# Patient Record
(demographics unavailable — no encounter records)

---

## 2024-11-20 NOTE — REASON FOR VISIT
[Arrhythmia/ECG Abnorrmalities] : arrhythmia/ECG abnormalities [Hyperlipidemia] : hyperlipidemia [Hypertension] : hypertension [FreeTextEntry3] : Dr. Goodman [FreeTextEntry1] : HORACIO SALVADOR  is a 64 year F  who presents today Nov 20, 2024 for clinical follow-up. Overall she has been feeling well. There has been no recent illness or hospital stay. Medications have remained unchanged. Asymptomatic from cardiovascular and arrhythmia standpoint.  Today she denies chest pain, pressure, unusual shortness of breath, lightheadedness, dizziness, near syncope or syncope.  History of HTN and HLD.  Monitoring BP at home and states readings are controlled.  Active with walking about a mile a day. She has been trying to control her diet with low-salt diet.  She has no increased alcohol intake. She is a smoker.  Unable to quit.  According to her she is trying.  She knows options which includes  New [employer] quit program.  Medical history is mainly significant for Essential hypertension.  No history of CHF CKD.  History of smoking.  Her blood pressure readings at home are remaining less than 130/80.  Elevated in doctor's offices according to her. Dyslipidemia was advised statin therapy which she has not been taking.  No significant side effects in the past Abnormal EKG nonspecific. Extensive history of smoking  Medical history not significant for microinfarction, coronary artery disease, cerebrovascular accident, peripheral artery disease, rheumatic fever, thyroid or Lyme disease.  She has no claudication pain, palpitation, dizziness near syncopal or syncopal event.  No claudication..  Overall stable weight.

## 2024-11-20 NOTE — PHYSICAL EXAM
[Well Developed] : well developed [Well Nourished] : well nourished [No Acute Distress] : no acute distress [Normal Venous Pressure] : normal venous pressure [No Carotid Bruit] : no carotid bruit [Normal S1, S2] : normal S1, S2 [No Rub] : no rub [No Gallop] : no gallop [Clear Lung Fields] : clear lung fields [Good Air Entry] : good air entry [No Respiratory Distress] : no respiratory distress  [Normal Gait] : normal gait [No Edema] : no edema [No Cyanosis] : no cyanosis [No Clubbing] : no clubbing [Normal Radial B/L] : normal radial B/L [Normal Speech] : normal speech [Alert and Oriented] : alert and oriented [de-identified] : Midsystolic murmur at the apex

## 2024-11-20 NOTE — DISCUSSION/SUMMARY
[FreeTextEntry1] : HORACIO SALVADOR  is a 64 year F  who presents today Nov 20, 2024 with the above history and the following active issues.  Essential hypertension.  Tolerating valsartan to 80 mg.  Goal BP <130/80 Low sodium diet  Remain active with cardiovascular exercise Recommend monitor BP at home over the next month and keep a log. We will review the readings over the phone. If BP not at goal recommend increasing Valsartan to 160mg QD  Calcific nonobstructive coronary artery disease. Continue aspirin 81 mg.  Continue atorvastatin to 40 mg.  Need to aim LDL cholesterol less than 70.  Risk benefits alternatives side effects again reviewed.  Pathophysiology of atherosclerosis discussed.  Continue beta-blockers.  Management of hypertension. Continued aggressive lifestyle and risk factor modification reviewed.  Smoking cessation discussed Change in clinical status would require invasive coronary angiography guided therapy. Follow-up fasting lipid panel Dyslipidemia.  Coronary atherosclerosis. LDL <70 Lifestyle and risk factor modification  Smoking cessation. Counseling done. Relationship with ASCVD, and associated morbidity, mortality was reviewed. Options available discussed. Based on above test we will discuss further management which may include invasive coronary angiography guided therapy.  Incidental finding of liver cyst.  According to her it is known to her.  Follow-up with your office.  If needed consider dedicated right upper quadrant sonogram.  Incidental finding of right upper lobe nodule.  Follow-up with Dr. Garza or pulmonary physician.  Red flag symptoms which would warrant sooner emergent evaluation reviewed with the patient.  Questions and concerns were addressed and answered.  Limitations of non-invasive testing reviewed  Sincerely,  Sully Smalls PA-C Patients history, testing and plan reviewed with supervising MD: Dr. Elva Balderrama

## 2024-11-20 NOTE — CARDIOLOGY SUMMARY
[de-identified] : March 14, 2023 normal sinus rhythm nonspecific ST-T change 4/3/24 nsr no stt changes  [de-identified] : ETT.  May 2, 2023.  5 minutes 23 seconds Ish protocol 7 METs PACs PVCs.  Dyspnea on exertion.  ST-T changes  equivocal for ischemia. [de-identified] : Echocardiogram.  EF 55 to 60% aortic calcification.  Liver cyst [de-identified] : Coronary CTA.  July 2023.  Nonobstructive.  0.9 cm right upper lobe nodule.  LAD 20%

## 2024-11-20 NOTE — PHYSICAL EXAM
[Well Developed] : well developed [Well Nourished] : well nourished [No Acute Distress] : no acute distress [Normal Venous Pressure] : normal venous pressure [No Carotid Bruit] : no carotid bruit [Normal S1, S2] : normal S1, S2 [No Rub] : no rub [No Gallop] : no gallop [Clear Lung Fields] : clear lung fields [Good Air Entry] : good air entry [No Respiratory Distress] : no respiratory distress  [Normal Gait] : normal gait [No Edema] : no edema [No Cyanosis] : no cyanosis [No Clubbing] : no clubbing [Normal Radial B/L] : normal radial B/L [Normal Speech] : normal speech [Alert and Oriented] : alert and oriented [de-identified] : Midsystolic murmur at the apex

## 2024-11-20 NOTE — REASON FOR VISIT
[Arrhythmia/ECG Abnorrmalities] : arrhythmia/ECG abnormalities [Hyperlipidemia] : hyperlipidemia [Hypertension] : hypertension [FreeTextEntry3] : Dr. Goodman [FreeTextEntry1] : HORACIO SALVADOR  is a 64 year F  who presents today Nov 20, 2024 for clinical follow-up. Overall she has been feeling well. There has been no recent illness or hospital stay. Medications have remained unchanged. Asymptomatic from cardiovascular and arrhythmia standpoint.  Today she denies chest pain, pressure, unusual shortness of breath, lightheadedness, dizziness, near syncope or syncope.  History of HTN and HLD.  Monitoring BP at home and states readings are controlled.  Active with walking about a mile a day. She has been trying to control her diet with low-salt diet.  She has no increased alcohol intake. She is a smoker.  Unable to quit.  According to her she is trying.  She knows options which includes  [facility] [employer] quit program.  Medical history is mainly significant for Essential hypertension.  No history of CHF CKD.  History of smoking.  Her blood pressure readings at home are remaining less than 130/80.  Elevated in doctor's offices according to her. Dyslipidemia was advised statin therapy which she has not been taking.  No significant side effects in the past Abnormal EKG nonspecific. Extensive history of smoking  Medical history not significant for microinfarction, coronary artery disease, cerebrovascular accident, peripheral artery disease, rheumatic fever, thyroid or Lyme disease.  She has no claudication pain, palpitation, dizziness near syncopal or syncopal event.  No claudication..  Overall stable weight.

## 2024-11-20 NOTE — COUNSELING
[Cessation strategies including cessation program discussed] : Cessation strategies including cessation program discussed [Use of nicotine replacement therapies and other medications discussed] : Use of nicotine replacement therapies and other medications discussed [Encouraged to pick a quit date and identify support needed to quit] : Encouraged to pick a quit date and identify support needed to quit [Smoking Cessation Program Referral] : Smoking Cessation Program Referral  [Yes] : Willing to quit smoking [FreeTextEntry2] : Does not want referral.  According to her she has spoken to people. [FreeTextEntry1] : 3

## 2024-11-20 NOTE — CARDIOLOGY SUMMARY
[de-identified] : March 14, 2023 normal sinus rhythm nonspecific ST-T change 4/3/24 nsr no stt changes  [de-identified] : ETT.  May 2, 2023.  5 minutes 23 seconds Ish protocol 7 METs PACs PVCs.  Dyspnea on exertion.  ST-T changes  equivocal for ischemia. [de-identified] : Echocardiogram.  EF 55 to 60% aortic calcification.  Liver cyst [de-identified] : Coronary CTA.  July 2023.  Nonobstructive.  0.9 cm right upper lobe nodule.  LAD 20%

## 2025-05-13 NOTE — CARDIOLOGY SUMMARY
[de-identified] : March 14, 2023 normal sinus rhythm nonspecific ST-T change 4/3/24 nsr non stt changes  May 13, 2025.  Normal sinus rhythm.  Nonspecific ST-T changes [de-identified] : ETT.  May 2, 2023.  5 minutes 23 seconds Ish protocol 7 METs PACs PVCs.  Dyspnea on exertion.  ST-T changes  equivocal for ischemia. [de-identified] : Echocardiogram.  EF 55 to 60% aortic calcification.  Liver cyst [de-identified] : Coronary CTA.  July 2023.  Nonobstructive.  0.9 cm right upper lobe nodule.  LAD 20%

## 2025-05-13 NOTE — PHYSICAL EXAM
[Normal S1, S2] : normal S1, S2 [Clear Lung Fields] : clear lung fields [No Edema] : no edema [Normal Radial B/L] : normal radial B/L

## 2025-05-13 NOTE — REASON FOR VISIT
[Arrhythmia/ECG Abnorrmalities] : arrhythmia/ECG abnormalities [Hyperlipidemia] : hyperlipidemia [Hypertension] : hypertension [FreeTextEntry3] : Dr. Goodman [FreeTextEntry1] : HORACIO SALVADOR  is a 65 year F  who presents today for clinical follow-up. Overall she has been feeling well. There has been no recent illness or hospital stay. Medications have remained unchanged. Asymptomatic from cardiovascular and arrhythmia standpoint.  Today she denies chest pain, pressure, unusual shortness of breath, lightheadedness, dizziness, near syncope or syncope.  History of HTN and HLD.  Monitoring BP at home and states readings are controlled. Continued cigarette smoking  Active with walking about a mile a day. She has been trying to control her diet with low-salt diet.  She has no increased alcohol intake. She is a smoker.  Unable to quit.  According to her she is trying.  She knows options which includes  [employer] quit program.  Medical history is mainly significant for Essential hypertension.  No history of CHF CKD.  History of smoking.  Her blood pressure readings at home are remaining less than 130/80.  Elevated in doctor's offices according to her. Dyslipidemia was advised statin therapy which she has not been taking.  No significant side effects in the past Abnormal EKG nonspecific. Extensive history of smoking  Medical history not significant for microinfarction, coronary artery disease, cerebrovascular accident, peripheral artery disease, rheumatic fever, thyroid or Lyme disease.  She has no claudication pain, palpitation, dizziness near syncopal or syncopal event.  No claudication..  Overall stable weight.

## 2025-05-13 NOTE — REASON FOR VISIT
[Arrhythmia/ECG Abnorrmalities] : arrhythmia/ECG abnormalities [Hyperlipidemia] : hyperlipidemia [Hypertension] : hypertension [FreeTextEntry3] : Dr. Goodman [FreeTextEntry1] : HORACIO SALVADOR  is a 65 year F  who presents today for clinical follow-up. Overall she has been feeling well. There has been no recent illness or hospital stay. Medications have remained unchanged. Asymptomatic from cardiovascular and arrhythmia standpoint.  Today she denies chest pain, pressure, unusual shortness of breath, lightheadedness, dizziness, near syncope or syncope.  History of HTN and HLD.  Monitoring BP at home and states readings are controlled. Continued cigarette smoking  Active with walking about a mile a day. She has been trying to control her diet with low-salt diet.  She has no increased alcohol intake. She is a smoker.  Unable to quit.  According to her she is trying.  She knows options which includes  [employer] quit program.  Medical history is mainly significant for Essential hypertension.  No history of CHF CKD.  History of smoking.  Her blood pressure readings at home are remaining less than 130/80.  Elevated in doctor's offices according to her. Dyslipidemia was advised statin therapy which she has not been taking.  No significant side effects in the past Abnormal EKG nonspecific. Extensive history of smoking  Medical history not significant for microinfarction, coronary artery disease, cerebrovascular accident, peripheral artery disease, rheumatic fever, thyroid or Lyme disease.  She has no claudication pain, palpitation, dizziness near syncopal or syncopal event.  No claudication..  Overall stable weight.

## 2025-05-13 NOTE — CARDIOLOGY SUMMARY
[de-identified] : March 14, 2023 normal sinus rhythm nonspecific ST-T change 4/3/24 nsr non stt changes  May 13, 2025.  Normal sinus rhythm.  Nonspecific ST-T changes [de-identified] : ETT.  May 2, 2023.  5 minutes 23 seconds Ish protocol 7 METs PACs PVCs.  Dyspnea on exertion.  ST-T changes  equivocal for ischemia. [de-identified] : Echocardiogram.  EF 55 to 60% aortic calcification.  Liver cyst [de-identified] : Coronary CTA.  July 2023.  Nonobstructive.  0.9 cm right upper lobe nodule.  LAD 20%

## 2025-05-13 NOTE — ASSESSMENT
[FreeTextEntry1] : Reviewed on 9/13/2025 EKG from today reviewed labs from April 30, 2025 CBC stable creatinine 0.85 sodium 143 potassium 4.6 LFT normal LDL 74 HDL 60 triglycerides 107 TSH 1.33

## 2025-05-13 NOTE — DISCUSSION/SUMMARY
[FreeTextEntry1] : HORACIO SALVADOR  is a 64 year F  who presents today Nov 20, 2024 with the above history and the following active issues.  Essential hypertension.  Tolerating valsartan to 80 mg.  Goal BP <130/80 Low sodium diet  Remain active with cardiovascular exercise Continue to follow blood pressure at home.  Calcific nonobstructive coronary artery disease. Continue aspirin 81 mg.  Continue atorvastatin to 40 mg.  Need to aim LDL cholesterol less than 70.  Risk benefits alternatives side effects again reviewed.  Pathophysiology of atherosclerosis discussed.  Continue beta-blockers.  Management of hypertension. Continued aggressive lifestyle and risk factor modification reviewed.  Smoking cessation discussed Change in clinical status would require invasive coronary angiography guided therapy. Follow-up fasting lipid panel Dyslipidemia.  Coronary atherosclerosis. LDL <70 Lifestyle and risk factor modification  Smoking cessation. Counseling done. Relationship with ASCVD, and associated morbidity, mortality was reviewed. Options available discussed.  Incidental finding of liver cyst.  According to her it is known to her.  Follow-up with your office.  If needed consider dedicated right upper quadrant sonogram.  Incidental finding of right upper lobe nodule.  Follow-up with Dr. Garza or pulmonary physician.  Red flag symptoms which would warrant sooner emergent evaluation reviewed with the patient.  Questions and concerns were addressed and answered.  Limitations of non-invasive testing reviewed Counseling regarding low saturated fat,salt and carbohydrate intake was reviewed. Active lifestyle and regular exercise  along with weight management is advised. I have reviewed above at length. I answered all the questions. Patient verbalized understandings. Thank you very much for allowing me to participate in your patient's care. Please feel free to call me for any questions.  Sincerely,   Elva Balderrama MD, FACC, TONYA [EKG obtained to assist in diagnosis and management of assessed problem(s)] : EKG obtained to assist in diagnosis and management of assessed problem(s)